# Patient Record
Sex: FEMALE | Race: BLACK OR AFRICAN AMERICAN | URBAN - METROPOLITAN AREA
[De-identification: names, ages, dates, MRNs, and addresses within clinical notes are randomized per-mention and may not be internally consistent; named-entity substitution may affect disease eponyms.]

---

## 2024-02-22 ENCOUNTER — OFFICE VISIT (OUTPATIENT)
Age: 54
End: 2024-02-22

## 2024-02-22 VITALS
DIASTOLIC BLOOD PRESSURE: 96 MMHG | HEART RATE: 98 BPM | WEIGHT: 160.8 LBS | RESPIRATION RATE: 16 BRPM | TEMPERATURE: 98.4 F | OXYGEN SATURATION: 98 % | SYSTOLIC BLOOD PRESSURE: 132 MMHG

## 2024-02-22 DIAGNOSIS — J02.9 SORE THROAT: ICD-10-CM

## 2024-02-22 DIAGNOSIS — J06.9 UPPER RESPIRATORY TRACT INFECTION, UNSPECIFIED TYPE: Primary | ICD-10-CM

## 2024-02-22 LAB
INFLUENZA A ANTIBODY: NEGATIVE
INFLUENZA B ANTIBODY: NEGATIVE
STREPTOCOCCUS A RNA: NEGATIVE

## 2024-02-22 RX ORDER — LEVOTHYROXINE SODIUM 88 UG/1
88 TABLET ORAL DAILY
COMMUNITY

## 2024-02-22 RX ORDER — METOPROLOL SUCCINATE 50 MG/1
50 TABLET, EXTENDED RELEASE ORAL DAILY
COMMUNITY

## 2024-02-22 RX ORDER — ALPRAZOLAM 1 MG/1
1 TABLET ORAL NIGHTLY PRN
COMMUNITY

## 2024-02-22 RX ORDER — AZITHROMYCIN 250 MG/1
TABLET, FILM COATED ORAL
Qty: 6 TABLET | Refills: 0 | Status: SHIPPED | OUTPATIENT
Start: 2024-02-22 | End: 2024-03-03

## 2024-02-22 ASSESSMENT — ENCOUNTER SYMPTOMS
COUGH: 0
ABDOMINAL PAIN: 0
VOMITING: 0
DIARRHEA: 0
CHEST TIGHTNESS: 0
SHORTNESS OF BREATH: 0
NAUSEA: 0
SINUS PRESSURE: 0
SORE THROAT: 0
CONSTIPATION: 0

## 2024-02-22 NOTE — PROGRESS NOTES
SANDHYA Waters (:  1970) is a 53 y.o. female,New patient, here for evaluation of the following chief complaint(s):  Pharyngitis (Sore throat, congestion x 1 week)      ASSESSMENT/PLAN:    ICD-10-CM    1. Upper respiratory tract infection, unspecified type  J06.9 azithromycin (ZITHROMAX) 250 MG tablet      2. Sore throat  J02.9 POCT Rapid Strep A DNA     POCT Influenza A/B          Patient presents for sore throat, congestion ongoing for greater than one week.   POCT: strep negative  POCT: COVID negative  Patient's blood pressure elevated, repeat BP still elevated, Please recheck your blood pressure after taking blood pressure medication. If this continue staying elevated please follow up with PCP for possible blood pressure medication adjustment  Given patient symptoms ongoing for over 1 week exam more consistent with bacterial etiology over viral. Will send azithromycin for symptoms.   DDX: Bacterial URI vs bronchitis vs allergic rhinitis.       SUBJECTIVE/OBJECTIVE:    History provided by:  Patient   used: No      HPI:   53 y.o. female presents with symptoms of pharyngitis, chills, fever, and fatigue ongoing since one week. She state that she was diagnosed with conjunctivitis prior to this and thinks her symptoms may have been ongoing even before then. She states that she has tried taking ibuprofen for symptoms. 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   Vitals:    24 0910 24 1006   BP: (!) 143/93 (!) 132/96   Site: Right Upper Arm Left Upper Arm   Position: Sitting Sitting   Cuff Size: Medium Adult Large Adult   Pulse: 98    Resp: 16    Temp: 98.4 °F (36.9 °C)    TempSrc: Oral    SpO2: 98%    Weight: 72.9 kg (160 lb 12.8 oz)        Review of Systems   Constitutional:  Negative for chills, diaphoresis, fatigue and fever.   HENT:  Negative for congestion, sinus pressure and sore throat.    Respiratory:  Negative for cough, chest tightness and

## 2024-02-22 NOTE — PATIENT INSTRUCTIONS
Please recheck your blood pressure after taking blood pressure medication. If this continue staying elevated please follow up with PCP for possible blood pressure medication adjustment  Please take medications as prescribed   Please follow up with PCP if symptoms persist.